# Patient Record
Sex: FEMALE | Race: OTHER
[De-identification: names, ages, dates, MRNs, and addresses within clinical notes are randomized per-mention and may not be internally consistent; named-entity substitution may affect disease eponyms.]

---

## 2019-08-06 ENCOUNTER — APPOINTMENT (OUTPATIENT)
Dept: SURGERY | Facility: CLINIC | Age: 34
End: 2019-08-06
Payer: COMMERCIAL

## 2019-08-06 VITALS
BODY MASS INDEX: 44.32 KG/M2 | SYSTOLIC BLOOD PRESSURE: 140 MMHG | DIASTOLIC BLOOD PRESSURE: 89 MMHG | HEIGHT: 65 IN | WEIGHT: 266 LBS

## 2019-08-06 DIAGNOSIS — Z83.79 FAMILY HISTORY OF OTHER DISEASES OF THE DIGESTIVE SYSTEM: ICD-10-CM

## 2019-08-06 DIAGNOSIS — Z78.9 OTHER SPECIFIED HEALTH STATUS: ICD-10-CM

## 2019-08-06 DIAGNOSIS — N63.10 UNSPECIFIED LUMP IN THE RIGHT BREAST, UNSPECIFIED QUADRANT: ICD-10-CM

## 2019-08-06 PROBLEM — Z00.00 ENCOUNTER FOR PREVENTIVE HEALTH EXAMINATION: Status: ACTIVE | Noted: 2019-08-06

## 2019-08-06 PROCEDURE — 99243 OFF/OP CNSLTJ NEW/EST LOW 30: CPT

## 2019-08-06 NOTE — CONSULT LETTER
[Consult Letter:] : I had the pleasure of evaluating your patient, [unfilled]. [Dear  ___] : Dear  [unfilled], [Consult Closing:] : Thank you very much for allowing me to participate in the care of this patient.  If you have any questions, please do not hesitate to contact me. [Please see my note below.] : Please see my note below. [Sincerely,] : Sincerely, [FreeTextEntry3] : Dale Burleson MD, FACS\par [facility]\[place] [address]\par  [DrBria  ___] : Dr. GRIFFITH

## 2019-08-06 NOTE — HISTORY OF PRESENT ILLNESS
[FreeTextEntry1] : Patient presents to the office with a history of right breast mass that she found 2 weeks ago. She just had a mammography done diagnostic as well as ultrasound on August 2. She is here to discuss the breast mass and possibilities.

## 2019-08-06 NOTE — PHYSICAL EXAM
[Atraumatic] : atraumatic [Normocephalic] : normocephalic [Supple] : supple [No Supraclavicular Adenopathy] : no supraclavicular adenopathy [Examined in the supine and seated position] : examined in the supine and seated position [Symmetrical] : symmetrical [No dominant masses] : no dominant masses left breast [No Nipple Retraction] : no left nipple retraction [No Nipple Discharge] : no left nipple discharge [___cm] : ~M [unfilled] ~Ucm upper outer quadrant mass [Breast Mass Left Breast ___cm] : no masses [Breast Nipple Inversion] : nipples not inverted [Breast Nipple Retraction] : nipples not retracted [Breast Nipple Flattening] : nipples not flattened [Breast Nipple Fissures] : nipples not fissured [Breast Abnormal Lactation (Galactorrhea)] : no galactorrhea [Breast Abnormal Secretion Bloody Fluid] : no bloody discharge [Breast Abnormal Secretion Serous Fluid] : no serous discharge [Breast Abnormal Secretion Opalescent Fluid] : no milky discharge [No Axillary Lymphadenopathy] : no left axillary lymphadenopathy [No Rashes] : no rashes [No Ulceration] : no ulceration [de-identified] : In the right axillary region in the central part of approximately 2.5 cm size of the mass. No other mass could be palpated in the anterior axillary or posterior to a or lateral or medial aspect. No supraclavicular lymphadenopathy.

## 2019-08-06 NOTE — PAST MEDICAL HISTORY
[Menstruating] : The patient is menstruating [Menarche Age ____] : age at menarche was [unfilled] [Approximately ___] : the LMP was approximately [unfilled] [Normal Amount/Duration] : it was of a normal amount and duration [Regular Cycle Intervals] : have been regular [Total Preg ___] : G[unfilled] [Live Births ___] : P[unfilled]  [Full Term ___] : Full Term: [unfilled] [Living ___] : Living: [unfilled] [History of Hormone Replacement Treatment] : has no history of hormone replacement treatment [FreeTextEntry5] : c-sections  [FreeTextEntry6] : none [FreeTextEntry7] : yes  [FreeTextEntry8] : age 28 for 2 weeks age 32 for 1 month

## 2019-08-06 NOTE — DATA REVIEWED
[FreeTextEntry1] : Patient's mammography and sonogram reports are reviewed. The 3 masses in the breast at 10:00 position approximately 13 cm away and one of the mass for 2 cm away from the nipple and that H. approximately 1.6 or centimeter in size. There is also a large lymph node in the right axilla about measuring approximately 2.7 cm in size.

## 2019-08-06 NOTE — REASON FOR VISIT
[FreeTextEntry1] : [place] is a 34 yr old female here today for an abnormal mammo/sono mass in right breast at 10 O'Clock. No family h/o of cancer

## 2019-08-13 ENCOUNTER — APPOINTMENT (OUTPATIENT)
Dept: SURGERY | Facility: CLINIC | Age: 34
End: 2019-08-13
Payer: COMMERCIAL

## 2019-08-13 PROCEDURE — 99212 OFFICE O/P EST SF 10 MIN: CPT

## 2019-08-13 NOTE — DATA REVIEWED
[FreeTextEntry1] : The patient's biopsy report of the 3 areas at around 10:00 position as well as the light right axillary lymph node biopsy result were discussed with the patient as well as with her . Patient was also explained about her hormonal status with the ER PA positive and HER-2/jose d also been positive.

## 2019-08-13 NOTE — ASSESSMENT
[FreeTextEntry1] : After reviewing the reports and discussing with the patient as well as her  patient was explained about her options. Patient was explained about HER-2/jose d being positive and the implication of that. She is seen oncologist on Friday. Patient was advised to take her slides to [facility]. Patient was explained about different staging and possible neoadjuvant chemotherapy. MRI and possible PET scan would also explain to the patient. Patient was also told to call the office if she needed anything from us.

## 2019-08-13 NOTE — CONSULT LETTER
[Dear  ___] : Dear  [unfilled], [Please see my note below.] : Please see my note below. [Courtesy Letter:] : I had the pleasure of seeing your patient, [unfilled], in my office today. [FreeTextEntry3] : Dale Burleson MD, FACS\par [facility]\[place] [address]\par  [Sincerely,] : Sincerely,

## 2019-08-16 ENCOUNTER — LABORATORY RESULT (OUTPATIENT)
Age: 34
End: 2019-08-16

## 2019-08-16 ENCOUNTER — OUTPATIENT (OUTPATIENT)
Dept: OUTPATIENT SERVICES | Facility: HOSPITAL | Age: 34
LOS: 1 days | Discharge: HOME | End: 2019-08-16

## 2019-08-16 ENCOUNTER — APPOINTMENT (OUTPATIENT)
Dept: HEMATOLOGY ONCOLOGY | Facility: CLINIC | Age: 34
End: 2019-08-16
Payer: COMMERCIAL

## 2019-08-16 VITALS
BODY MASS INDEX: 46.32 KG/M2 | DIASTOLIC BLOOD PRESSURE: 79 MMHG | HEIGHT: 65 IN | RESPIRATION RATE: 16 BRPM | WEIGHT: 278 LBS | HEART RATE: 79 BPM | SYSTOLIC BLOOD PRESSURE: 149 MMHG | TEMPERATURE: 99.2 F

## 2019-08-16 DIAGNOSIS — Z80.3 FAMILY HISTORY OF MALIGNANT NEOPLASM OF BREAST: ICD-10-CM

## 2019-08-16 DIAGNOSIS — C50.411 MALIGNANT NEOPLASM OF UPPER-OUTER QUADRANT OF RIGHT FEMALE BREAST: ICD-10-CM

## 2019-08-16 PROCEDURE — 99245 OFF/OP CONSLTJ NEW/EST HI 55: CPT

## 2019-08-16 NOTE — OB HISTORY
[Definite:  ___ (Date)] : the last menstrual period was [unfilled] [Menarche Age: ____] : age at menarche was [unfilled] [___] :  Spontaneous: [unfilled]

## 2019-08-21 ENCOUNTER — FORM ENCOUNTER (OUTPATIENT)
Age: 34
End: 2019-08-21

## 2019-08-22 ENCOUNTER — OUTPATIENT (OUTPATIENT)
Dept: OUTPATIENT SERVICES | Facility: HOSPITAL | Age: 34
LOS: 1 days | Discharge: HOME | End: 2019-08-22
Payer: COMMERCIAL

## 2019-08-22 DIAGNOSIS — I42.9 CARDIOMYOPATHY, UNSPECIFIED: ICD-10-CM

## 2019-08-22 PROCEDURE — 93306 TTE W/DOPPLER COMPLETE: CPT | Mod: 26

## 2019-08-25 LAB
25(OH)D3 SERPL-MCNC: 29 NG/ML
ALBUMIN SERPL ELPH-MCNC: 4.3 G/DL
ALP BLD-CCNC: 108 U/L
ALT SERPL-CCNC: 15 U/L
ANION GAP SERPL CALC-SCNC: 14 MMOL/L
APTT BLD: 29.6 SEC
AST SERPL-CCNC: 13 U/L
BILIRUB SERPL-MCNC: 0.4 MG/DL
BUN SERPL-MCNC: 12 MG/DL
CALCIUM SERPL-MCNC: 9.4 MG/DL
CANCER AG15-3 SERPL-ACNC: 17.8 U/ML
CEA SERPL-MCNC: 1 NG/ML
CHLORIDE SERPL-SCNC: 103 MMOL/L
CO2 SERPL-SCNC: 24 MMOL/L
CREAT SERPL-MCNC: 0.9 MG/DL
GLUCOSE SERPL-MCNC: 82 MG/DL
HCT VFR BLD CALC: 40.9 %
HGB BLD-MCNC: 13.1 G/DL
INR PPP: 1 RATIO
MCHC RBC-ENTMCNC: 28.6 PG
MCHC RBC-ENTMCNC: 32 G/DL
MCV RBC AUTO: 89.3 FL
PLATELET # BLD AUTO: 383 K/UL
PMV BLD: 9.7 FL
POTASSIUM SERPL-SCNC: 4.7 MMOL/L
PROT SERPL-MCNC: 7.7 G/DL
PT BLD: 11.5 SEC
RBC # BLD: 4.58 M/UL
RBC # FLD: 13.3 %
SODIUM SERPL-SCNC: 141 MMOL/L
WBC # FLD AUTO: 7.93 K/UL

## 2019-08-28 ENCOUNTER — APPOINTMENT (OUTPATIENT)
Dept: HEMATOLOGY ONCOLOGY | Facility: CLINIC | Age: 34
End: 2019-08-28

## 2019-09-06 DIAGNOSIS — C50.411 MALIGNANT NEOPLASM OF UPPER-OUTER QUADRANT OF RIGHT FEMALE BREAST: ICD-10-CM

## 2019-09-06 NOTE — RESULTS/DATA
[FreeTextEntry1] : Patient Name:  MARIKA BERMAN  Patient ID:  [id number]  \par  \par Patient :   1985 Gender:   Female  \par Accession Number:   [id number] Study Date:   07 Aug 2019 10:43 \par Referring Phys.:  SADAF MALLORY Performing Location:   [facility]  \par EXAM:  MAMMO DIGITAL DIAGNOSTIC RIGHT \par \par --------------------------------------------------------------------------------\par \par \par  \par Pathology Report Received From [facility]:\par \par The pathology report of the right breast ultrasound biopsy dated 2019 indicated that the targeted lesions are MALIGNANT\par \par DIAGNOSIS:\par \par 1. BREAST, RIGHT, 10 O'CLOCK, 13 CM FN, A: CORE BIOPSY\par - INVASIVE DUCTAL CARCINOMA, POORLY DIFFERENTIATED\par - DUCTAL CARCINOMA IN SITU, HIGH NUCLEAR GRADE, SOLID TYPE\par ASSOCIATED WITH COMEDONECROSIS\par 2. BREAST, RIGHT, 10 O'CLOCK, 13 CM FN, B: CORE BIOPSY\par - INVASIVE DUCTAL CARCINOMA, POORLY DIFFERENTIATED\par 3. BREAST, RIGHT, 10 O'CLOCK, 14 CM FN, C: CORE BIOPSY\par - INVASIVE DUCTAL CARCINOMA, POORLY DIFFERENTIATED\par - DUCTAL CARCINOMA IN SITU, HIGH NUCLEAR GRADE, SOLID TYPE\par ASSOCIATED WITH COMEDONECROSIS\par 4. BREAST, RIGHT AXILLA, D: CORE BIOPSY\par - INVASIVE DUCTAL CARCINOMA, POORLY DIFFERENTIATED (SEE NOTE)\par \par \par The pathology results are concordant with the imaging findings.\par \par The patient has been notified of these results by telephone. She has been advised to contact your office and to arrange for SURGICAL AND ONCOLOGIC CONSULTATION AND MANAGEMENT.\par \par Surgical consultation of the right breast(s) is recommended. _\par \par Electronic Signature: I personally reviewed the images and agree with this report. Final Report: Dictated by and Signed by Attending Xuan Alonzo MD 2019 1:20 PM\par *******END OF ADDENDUM******\par \par IMPRESSION:\par \par Ultrasound guided needle biopsy of the right breast. Pathology pending.\par \par A final report will be issued when Pathology results are available. _\par \par US BREAST CORE BIOPSY RIGHT, MAMMO DIGITAL POST PROCEDURE RIGHT\par \par HISTORY: Ultrasound of the right breast dated 2019 revealed a mass at 10:00 o'clock 13 cm from the nipple (#1) which was recommended for biopsy.\par \par Benefits, risks and alternatives to the procedure were explained to the patient and informed written consent was obtained.\par \par The patient denied taking aspirin or anticoagulants and stated no allergies to topical antiseptic solutions or local anesthetic.\par \par Utilizing universal protocol, site and patient verification was performed prior to starting the procedure.\par \par PROCEDURE: After sterile skin preparation, local anesthesia was achieved with 1% lidocaine. Under ultrasound guidance, a 12G vacuum assisted needle biopsy device was used to obtain multiple core specimens.\par \par After the procedure, a tophat marking clip was deployed in the area of sampling.\par \par The patient tolerated the procedure well without immediate complications.\par \par POST PROCEDURE MAMMOGRAM FOR MARKER PLACEMENT\par \par A post-procedure mammogram was performed and demonstrates a clip in the appropriate location.\par \par Electronic Signature: I personally reviewed the images and agree with this report. Final Report: Dictated by and Signed by Attending Xuan Alonzo MD 2019 11:49 AM\par \par \par \par  \par

## 2019-09-06 NOTE — CONSULT LETTER
[Dear  ___] : Dear  [unfilled], [Consult Letter:] : I had the pleasure of evaluating your patient, [unfilled]. [Sincerely,] : Sincerely, [Please see my note below.] : Please see my note below. [DrBria  ___] : Dr. GRIFFITH [DrBria ___] : Dr. GRIFFITH [FreeTextEntry3] : Irene Ulloa MD

## 2019-09-06 NOTE — HISTORY OF PRESENT ILLNESS
[Disease: _____________________] : Disease: [unfilled] [de-identified] : This is a very pleasant 34 year old premenopausal woman who comes for eval of breast cancer.\par \par She initially palpated a lump in her right breast in July 2019.\par Further w/u was ordered by her GYN.\par She underwent a mammogram and USG\par which showed a focal asymmetry with multiple scattered nodularity spanning 7 cm at 10:00 o'clock axis 13cm from the nipple\par On USG it measured 1.3 x 0.9 x 1.6 cm\par Another mass measuring 0.4 x 0.5 x 0.5 cm located 5cm from the index lesion was also seen\par An enlarged LN was noted in the RT axilla 2.7cm\par \par  She underwent USG guided biopsies of these lesions.\par \par PATHOLOGY\par  BREAST, RIGHT, 10 O'CLOCK, 13 CM FN, A: CORE BIOPSY\par - INVASIVE DUCTAL CARCINOMA, POORLY DIFFERENTIATED\par - DUCTAL CARCINOMA IN SITU, HIGH NUCLEAR GRADE, SOLID TYPE\par ASSOCIATED WITH COMEDONECROSIS\par 2. BREAST, RIGHT, 10 O'CLOCK, 13 CM FN, B: CORE BIOPSY\par - INVASIVE DUCTAL CARCINOMA, POORLY DIFFERENTIATED\par 3. BREAST, RIGHT, 10 O'CLOCK, 14 CM FN, C: CORE BIOPSY\par - INVASIVE DUCTAL CARCINOMA, POORLY DIFFERENTIATED\par - DUCTAL CARCINOMA IN SITU, HIGH NUCLEAR GRADE, SOLID TYPE\par ASSOCIATED WITH COMEDONECROSIS\par \par The tumor is ER +  %\par OK + %\par HER2  3+ positive\par Ki-67 70%\par Pt denies any nipple discharge.\par \par No bone pain, no abdominal pain.\par She has been on OCPS x 6 years on and off.\par  [de-identified] : IDC

## 2019-09-06 NOTE — PHYSICAL EXAM
[Normal] : affect appropriate [de-identified] : palpable mass in UOQ of right breast, palpable Rt axillary LN\par A right breast upper outer quadrant mass and right axillary lymph node \par \par A right breast upper outer quadrant mass and right axillary lymph node \par \par A right breast upper outer quadrant mass and right axillary lymph node \par \par \par A right breast upper outer quadrant mass and right axillary lymph node \par \par \par A right breast upper outer quadrant mass and right axillary lymph node \par \par Rt breast UOQ mass, positive axillary LN, left breast and axilla unremarkable

## 2022-09-20 ENCOUNTER — NON-APPOINTMENT (OUTPATIENT)
Age: 37
End: 2022-09-20

## 2022-09-28 ENCOUNTER — APPOINTMENT (OUTPATIENT)
Dept: OBGYN | Facility: CLINIC | Age: 37
End: 2022-09-28

## 2022-09-28 VITALS
SYSTOLIC BLOOD PRESSURE: 125 MMHG | HEIGHT: 65 IN | BODY MASS INDEX: 31.32 KG/M2 | WEIGHT: 188 LBS | DIASTOLIC BLOOD PRESSURE: 70 MMHG

## 2022-09-28 DIAGNOSIS — Z01.419 ENCOUNTER FOR GYNECOLOGICAL EXAMINATION (GENERAL) (ROUTINE) W/OUT ABNORMAL FINDINGS: ICD-10-CM

## 2022-09-28 PROCEDURE — 99385 PREV VISIT NEW AGE 18-39: CPT

## 2022-09-28 RX ORDER — TAMOXIFEN CITRATE 20 MG/1
TABLET, FILM COATED ORAL
Refills: 0 | Status: ACTIVE | COMMUNITY

## 2022-09-28 RX ORDER — NORETHINDRONE AND ETHINYL ESTRADIOL 1 MG-35MCG
1-35 KIT ORAL
Refills: 0 | Status: COMPLETED | COMMUNITY
End: 2022-09-28

## 2022-09-28 NOTE — HISTORY OF PRESENT ILLNESS
[FreeTextEntry1] : 36 yo P2 for annual exam\par pmhx: Her 2-jose d receptor positive breast cancr, ER+, RI + s/p double mastectomy, LN dissection, chemo and radiation now on tamoxifen since 2020. Reports she had genetic testing for cancer syndromes and was negative. She follows at [facility]\par Periods are irregular q 2-3 months on tamoxifen. \par Sexually active w/ , using condoms\par Denies h/o cysts, fibroids, STD's

## 2022-09-28 NOTE — PHYSICAL EXAM
[Appropriately responsive] : appropriately responsive [Soft] : soft [Non-tender] : non-tender [Non-distended] : non-distended [No Lesions] : no lesions [No Mass] : no mass [FreeTextEntry6] : declined breast exam at this time as she just had reconstruction surgery [Labia Majora] : normal [Labia Minora] : normal [No Bleeding] : There was no active vaginal bleeding [Normal] : normal [Uterine Adnexae] : normal

## 2022-09-28 NOTE — DISCUSSION/SUMMARY
[FreeTextEntry1] : 36 yo for annual exam\par - f/u pap and HPV\par - f/u 1 year\par [place] [facility] [place]

## 2022-10-18 LAB
CYTOLOGY CVX/VAG DOC THIN PREP: NORMAL
HPV HIGH+LOW RISK DNA PNL CVX: NOT DETECTED